# Patient Record
Sex: FEMALE | Race: BLACK OR AFRICAN AMERICAN | NOT HISPANIC OR LATINO | URBAN - METROPOLITAN AREA
[De-identification: names, ages, dates, MRNs, and addresses within clinical notes are randomized per-mention and may not be internally consistent; named-entity substitution may affect disease eponyms.]

---

## 2020-07-25 ENCOUNTER — TELEPHONE ENCOUNTER (OUTPATIENT)
Dept: URBAN - METROPOLITAN AREA CLINIC 13 | Facility: CLINIC | Age: 60
End: 2020-07-25

## 2020-07-26 ENCOUNTER — TELEPHONE ENCOUNTER (OUTPATIENT)
Dept: URBAN - METROPOLITAN AREA CLINIC 13 | Facility: CLINIC | Age: 60
End: 2020-07-26

## 2020-10-07 ENCOUNTER — LAB OUTSIDE AN ENCOUNTER (OUTPATIENT)
Dept: URBAN - METROPOLITAN AREA CLINIC 113 | Facility: CLINIC | Age: 60
End: 2020-10-07

## 2020-10-07 ENCOUNTER — WEB ENCOUNTER (OUTPATIENT)
Dept: URBAN - METROPOLITAN AREA CLINIC 113 | Facility: CLINIC | Age: 60
End: 2020-10-07

## 2020-10-07 ENCOUNTER — OFFICE VISIT (OUTPATIENT)
Dept: URBAN - METROPOLITAN AREA CLINIC 113 | Facility: CLINIC | Age: 60
End: 2020-10-07
Payer: COMMERCIAL

## 2020-10-07 VITALS
HEART RATE: 70 BPM | HEIGHT: 65 IN | WEIGHT: 158 LBS | SYSTOLIC BLOOD PRESSURE: 119 MMHG | BODY MASS INDEX: 26.33 KG/M2 | RESPIRATION RATE: 18 BRPM | TEMPERATURE: 98.1 F | DIASTOLIC BLOOD PRESSURE: 79 MMHG

## 2020-10-07 DIAGNOSIS — Z12.11 COLON CANCER SCREENING: ICD-10-CM

## 2020-10-07 PROCEDURE — 99201 OFFICE OR OTHER OUTPATIENT VISIT FOR THE EVALUATION AND MANAGEMENT OF A NEW PATIENT, WHICH REQUIRES THESE 3 KEY COMPONENTS: A PROBLEM FOCUSED HISTOR: CPT | Performed by: INTERNAL MEDICINE

## 2020-10-07 NOTE — HPI-TODAY'S VISIT:
The patient is a very delightful 60-year-old female who I last saw 10 years ago for screening colonoscopy which was unremarkable.  She has no gastrointestinal complaints currently.  There is no family history of colon cancer.  She has no cardiac or pulmonary issues.

## 2020-10-28 ENCOUNTER — TELEPHONE ENCOUNTER (OUTPATIENT)
Dept: URBAN - METROPOLITAN AREA CLINIC 113 | Facility: CLINIC | Age: 60
End: 2020-10-28

## 2020-10-28 RX ORDER — SODIUM, POTASSIUM,MAG SULFATES 17.5-3.13G
354ML SOLUTION, RECONSTITUTED, ORAL ORAL
Qty: 354 MILLILITER | Refills: 0 | OUTPATIENT
Start: 2020-10-28 | End: 2020-10-29

## 2020-11-02 ENCOUNTER — CLAIMS CREATED FROM THE CLAIM WINDOW (OUTPATIENT)
Dept: URBAN - METROPOLITAN AREA CLINIC 4 | Facility: CLINIC | Age: 60
End: 2020-11-02
Payer: COMMERCIAL

## 2020-11-02 ENCOUNTER — OFFICE VISIT (OUTPATIENT)
Dept: URBAN - METROPOLITAN AREA SURGERY CENTER 25 | Facility: SURGERY CENTER | Age: 60
End: 2020-11-02
Payer: COMMERCIAL

## 2020-11-02 DIAGNOSIS — D12.3 ADENOMA OF TRANSVERSE COLON: ICD-10-CM

## 2020-11-02 DIAGNOSIS — Z12.11 COLON CANCER SCREENING: ICD-10-CM

## 2020-11-02 DIAGNOSIS — K63.89 OTHER SPECIFIED DISEASES OF INTESTINE: ICD-10-CM

## 2020-11-02 DIAGNOSIS — K63.5 BENIGN COLON POLYP: ICD-10-CM

## 2020-11-02 DIAGNOSIS — D12.3 BENIGN NEOPLASM OF TRANSVERSE COLON: ICD-10-CM

## 2020-11-02 PROCEDURE — 45380 COLONOSCOPY AND BIOPSY: CPT | Performed by: INTERNAL MEDICINE

## 2020-11-02 PROCEDURE — 45385 COLONOSCOPY W/LESION REMOVAL: CPT | Performed by: INTERNAL MEDICINE

## 2020-11-02 PROCEDURE — 88305 TISSUE EXAM BY PATHOLOGIST: CPT | Performed by: PATHOLOGY

## 2020-11-02 PROCEDURE — G9933 CANC DETECTD DURING COL SCRN: HCPCS | Performed by: INTERNAL MEDICINE

## 2020-11-02 PROCEDURE — G8907 PT DOC NO EVENTS ON DISCHARG: HCPCS | Performed by: INTERNAL MEDICINE

## 2022-03-29 ENCOUNTER — DASHBOARD ENCOUNTERS (OUTPATIENT)
Age: 62
End: 2022-03-29

## 2022-03-29 ENCOUNTER — OFFICE VISIT (OUTPATIENT)
Dept: URBAN - METROPOLITAN AREA CLINIC 113 | Facility: CLINIC | Age: 62
End: 2022-03-29
Payer: COMMERCIAL

## 2022-03-29 VITALS
RESPIRATION RATE: 20 BRPM | DIASTOLIC BLOOD PRESSURE: 88 MMHG | TEMPERATURE: 98 F | BODY MASS INDEX: 27.16 KG/M2 | HEART RATE: 61 BPM | WEIGHT: 163 LBS | SYSTOLIC BLOOD PRESSURE: 178 MMHG | HEIGHT: 65 IN

## 2022-03-29 DIAGNOSIS — K86.9 PANCREATIC LESION: ICD-10-CM

## 2022-03-29 DIAGNOSIS — Z79.1 NSAID LONG-TERM USE: ICD-10-CM

## 2022-03-29 DIAGNOSIS — Z86.010 HISTORY OF COLON POLYPS: ICD-10-CM

## 2022-03-29 PROCEDURE — 99213 OFFICE O/P EST LOW 20 MIN: CPT | Performed by: PHYSICIAN ASSISTANT

## 2022-03-29 RX ORDER — ROSUVASTATIN CALCIUM 20 MG/1
1 TABLET TABLET, FILM COATED ORAL ONCE A DAY
Status: ACTIVE | COMMUNITY

## 2022-03-29 RX ORDER — PANTOPRAZOLE SODIUM 40 MG/1
1 TABLET TABLET, DELAYED RELEASE ORAL ONCE A DAY
Qty: 90 | Refills: 3 | OUTPATIENT
Start: 2022-03-29

## 2022-03-29 RX ORDER — METOPROLOL SUCCINATE 25 MG/1
1 TABLET TABLET, FILM COATED, EXTENDED RELEASE ORAL ONCE A DAY
Status: ACTIVE | COMMUNITY

## 2022-03-29 RX ORDER — ISOSORBIDE MONONITRATE 30 MG/1
1 TABLET IN THE MORNING TABLET, EXTENDED RELEASE ORAL ONCE A DAY
Status: ACTIVE | COMMUNITY

## 2022-03-29 NOTE — HPI-TODAY'S VISIT:
Ms. Sun is a 62-year-old woman with a history of diverticulosis.  She has been known to Dr. Vance for colon cancer screenings.  She was initially seen by Dr. Vance in 2010 for screening colonoscopy which was unremarkable. She was last seen on 10/7/2020 to discuss colonoscopy for screening purposes. Colonoscopy (11/2/2020): [place] bowel preparation scale score 6.  The examined portion of the ileum was normal.  A 5 mm polyp at the hepatic flexure was removed.  A 2 mm polyp from the hepatic flexure was removed as well.  Diverticulosis was noted in the sigmoid colon, in the transverse colon, at the hepatic flexure and in the ascending colon.  These returned as a combination of tubular adenomas and hyperplastic polyps status post polypectomy.  She is due for repeat colonoscopy in 2027. She had a chest CT performed on 12/7/2021 for evaluation of pulmonary embolism secondary to complaints of shortness of breath and generalized weakness.  His exam was negative for PE, but demonstrated a pancreatic tail hypodensity.  An MRI of the abdomen with and without contrast was recommended. MRI abdomen (1/11/2022): Tiny 3 mm cyst within the pancreatic tail.  Findings likely represent sidebranch IPMN versus pseudocyst or less likely MCN.  No nodularity, enhancement or ductal dilatation.  Follow-up recommended in 6 to 12 months to ensure stability.  She states that she feels well overall today.  She denies any abdominal pain.  She denies any significant changes in her past medical history or surgical history since her last office visit.  She denies any issues with heartburn or acid reflux.  No difficulty swallowing or regurgitation.  Her bowel movements are regular and normal in consistency, stating that she has roughly 3 bowel movements daily without red blood per rectum, melena or hematochezia.  She denies any unintentional weight loss, fevers, chills, night sweats or decreased appetite.  Of note, she does take ibuprofen 400 mg daily.  She tells me that she has been taking this for chronic joint pain in recently has increased her dose to 800 mg secondary to dental pain.  She plans to have an extensive dental surgery next month.  She is not on PPI at this time.  She otherwise feels well today.

## 2022-04-13 PROBLEM — 305058001: Status: ACTIVE | Noted: 2022-03-29

## 2022-04-13 PROBLEM — 428283002: Status: ACTIVE | Noted: 2022-04-13

## 2022-04-13 PROBLEM — 3855007: Status: ACTIVE | Noted: 2022-04-13

## 2025-02-19 ENCOUNTER — LAB OUTSIDE AN ENCOUNTER (OUTPATIENT)
Dept: URBAN - METROPOLITAN AREA CLINIC 113 | Facility: CLINIC | Age: 65
End: 2025-02-19

## 2025-02-19 ENCOUNTER — OFFICE VISIT (OUTPATIENT)
Dept: URBAN - METROPOLITAN AREA CLINIC 113 | Facility: CLINIC | Age: 65
End: 2025-02-19
Payer: COMMERCIAL

## 2025-02-19 VITALS
SYSTOLIC BLOOD PRESSURE: 124 MMHG | TEMPERATURE: 97 F | RESPIRATION RATE: 20 BRPM | HEART RATE: 79 BPM | BODY MASS INDEX: 24.19 KG/M2 | HEIGHT: 65 IN | WEIGHT: 145.2 LBS | DIASTOLIC BLOOD PRESSURE: 65 MMHG

## 2025-02-19 DIAGNOSIS — K86.2 PANCREATIC CYST: ICD-10-CM

## 2025-02-19 DIAGNOSIS — K92.1 HEMATOCHEZIA: ICD-10-CM

## 2025-02-19 DIAGNOSIS — Z86.0101 HISTORY OF ADENOMATOUS POLYP OF COLON: ICD-10-CM

## 2025-02-19 DIAGNOSIS — K57.31 DIVERTICULOSIS OF COLON WITH HEMORRHAGE: ICD-10-CM

## 2025-02-19 DIAGNOSIS — R74.8 ALKALINE PHOSPHATASE ELEVATION: ICD-10-CM

## 2025-02-19 PROBLEM — 733657002: Status: ACTIVE | Noted: 2025-02-19

## 2025-02-19 PROCEDURE — 99214 OFFICE O/P EST MOD 30 MIN: CPT | Performed by: INTERNAL MEDICINE

## 2025-02-19 RX ORDER — METOPROLOL SUCCINATE 25 MG/1
1 TABLET TABLET, FILM COATED, EXTENDED RELEASE ORAL ONCE A DAY
Status: ON HOLD | COMMUNITY

## 2025-02-19 RX ORDER — ISOSORBIDE MONONITRATE 30 MG/1
1 TABLET IN THE MORNING TABLET, EXTENDED RELEASE ORAL ONCE A DAY
Status: ON HOLD | COMMUNITY

## 2025-02-19 RX ORDER — PANTOPRAZOLE SODIUM 40 MG/1
1 TABLET TABLET, DELAYED RELEASE ORAL ONCE A DAY
Qty: 90 | Refills: 3 | Status: ON HOLD | COMMUNITY
Start: 2022-03-29

## 2025-02-19 RX ORDER — ROSUVASTATIN 20 MG/1
1 TABLET TABLET, FILM COATED ORAL ONCE A DAY
Status: ON HOLD | COMMUNITY

## 2025-02-19 NOTE — HPI-TODAY'S VISIT:
Ms. Sun is a 62-year-old woman with a history of diverticulosis.  She has been known to Dr. Vance for colon cancer screenings.  She was initially seen by Dr. Vance in 2010 for screening colonoscopy which was unremarkable. She was last seen on 10/7/2020 to discuss colonoscopy for screening purposes. Colonoscopy (11/2/2020): [place] bowel preparation scale score 6.  The examined portion of the ileum was normal.  A 5 mm polyp at the hepatic flexure was removed.  A 2 mm polyp from the hepatic flexure was removed as well.  Diverticulosis was noted in the sigmoid colon, in the transverse colon, at the hepatic flexure and in the ascending colon.  These returned as a combination of tubular adenomas and hyperplastic polyps status post polypectomy.  She is due for repeat colonoscopy in 2027. She had a chest CT performed on 12/7/2021 for evaluation of pulmonary embolism secondary to complaints of shortness of breath and generalized weakness.  His exam was negative for PE, but demonstrated a pancreatic tail hypodensity.  An MRI of the abdomen with and without contrast was recommended. MRI abdomen (1/11/2022): Tiny 3 mm cyst within the pancreatic tail.  Findings likely represent sidebranch IPMN versus pseudocyst or less likely MCN.  No nodularity, enhancement or ductal dilatation.  Follow-up recommended in 6 to 12 months to ensure stability.  She states that she feels well overall today.  She denies any abdominal pain.  She denies any significant changes in her past medical history or surgical history since her last office visit.  She denies any issues with heartburn or acid reflux.  No difficulty swallowing or regurgitation.  Her bowel movements are regular and normal in consistency, stating that she has roughly 3 bowel movements daily without red blood per rectum, melena or hematochezia.  She denies any unintentional weight loss, fevers, chills, night sweats or decreased appetite.  Of note, she does take ibuprofen 400 mg daily.  She tells me that she has been taking this for chronic joint pain in recently has increased her dose to 800 mg secondary to dental pain.  She plans to have an extensive dental surgery next month.  She is not on PPI at this time.  She otherwise feels well today. Interval history, 2/19/2025. Patient returns today after long interval absence. Referring records were reviewed.  Laboratory testing dated November 19, 2024 revealed a normal CMP except for a marginally elevated alkaline phosphatase at 122.  Hemoglobin A1c was 6.0. The patient comes in urgently.  She states that beginning 4 days ago she developed large amounts of bright red blood per rectum including clots.  The stool then became darker and then she had another episode of clots and now the stool is clearing up.  She is not on any blood thinners.  She denies any significant nonsteroidal anti-inflammatory agents.  She states she feels well otherwise. She denies any new past medical or past surgical history in the last 3 years since have seen her.

## 2025-02-21 ENCOUNTER — TELEPHONE ENCOUNTER (OUTPATIENT)
Dept: URBAN - METROPOLITAN AREA CLINIC 113 | Facility: CLINIC | Age: 65
End: 2025-02-21

## 2025-02-21 ENCOUNTER — CLAIMS CREATED FROM THE CLAIM WINDOW (OUTPATIENT)
Dept: URBAN - METROPOLITAN AREA MEDICAL CENTER 19 | Facility: MEDICAL CENTER | Age: 65
End: 2025-02-21
Payer: COMMERCIAL

## 2025-02-21 DIAGNOSIS — K57.90 DIVERTICULOSIS OF INTESTINE, PART UNSPECIFIED, WITHOUT PERFORATION OR ABSCESS WITHOUT BLEEDING: ICD-10-CM

## 2025-02-21 DIAGNOSIS — K59.00 CONSTIPATION, UNSPECIFIED: ICD-10-CM

## 2025-02-21 DIAGNOSIS — K57.31 DIVERTICULOSIS OF LARGE INTESTINE WITHOUT PERFORATION OR ABSCESS WITH BLEEDING: ICD-10-CM

## 2025-02-21 DIAGNOSIS — D62 ACUTE POSTHEMORRHAGIC ANEMIA: ICD-10-CM

## 2025-02-21 LAB
ABSOLUTE BASOPHILS: 21
ABSOLUTE EOSINOPHILS: 97
ABSOLUTE LYMPHOCYTES: 2381
ABSOLUTE MONOCYTES: 511
ABSOLUTE NEUTROPHILS: 3892
BASOPHILS: 0.3
EOSINOPHILS: 1.4
HEMATOCRIT: 26.7
HEMOGLOBIN: 8.6
LYMPHOCYTES: 34.5
MCH: 26.5
MCHC: 32.2
MCV: 82.2
MONOCYTES: 7.4
MPV: 10.6
NEUTROPHILS: 56.4
PLATELET COUNT: 261
RDW: 13.9
RED BLOOD CELL COUNT: 3.25
WHITE BLOOD CELL COUNT: 6.9

## 2025-02-21 PROCEDURE — 99254 IP/OBS CNSLTJ NEW/EST MOD 60: CPT | Performed by: INTERNAL MEDICINE

## 2025-02-22 ENCOUNTER — TELEPHONE ENCOUNTER (OUTPATIENT)
Dept: URBAN - METROPOLITAN AREA CLINIC 113 | Facility: CLINIC | Age: 65
End: 2025-02-22

## 2025-03-21 ENCOUNTER — CLAIMS CREATED FROM THE CLAIM WINDOW (OUTPATIENT)
Dept: URBAN - METROPOLITAN AREA SURGERY CENTER 25 | Facility: SURGERY CENTER | Age: 65
End: 2025-03-21
Payer: COMMERCIAL

## 2025-03-21 DIAGNOSIS — K92.1 MELENA: ICD-10-CM

## 2025-03-21 DIAGNOSIS — K92.1 HEMATOCHEZIA: ICD-10-CM

## 2025-03-21 PROCEDURE — 45378 DIAGNOSTIC COLONOSCOPY: CPT | Performed by: INTERNAL MEDICINE

## 2025-03-21 PROCEDURE — 00811 ANES LWR INTST NDSC NOS: CPT | Performed by: ANESTHESIOLOGIST ASSISTANT

## 2025-03-21 PROCEDURE — 00811 ANES LWR INTST NDSC NOS: CPT | Performed by: ANESTHESIOLOGY

## 2025-03-21 RX ORDER — ROSUVASTATIN 20 MG/1
1 TABLET TABLET, FILM COATED ORAL ONCE A DAY
Status: ON HOLD | COMMUNITY

## 2025-03-21 RX ORDER — METOPROLOL SUCCINATE 25 MG/1
1 TABLET TABLET, FILM COATED, EXTENDED RELEASE ORAL ONCE A DAY
Status: ON HOLD | COMMUNITY

## 2025-03-21 RX ORDER — ISOSORBIDE MONONITRATE 30 MG/1
1 TABLET IN THE MORNING TABLET, EXTENDED RELEASE ORAL ONCE A DAY
Status: ON HOLD | COMMUNITY

## 2025-03-21 RX ORDER — PANTOPRAZOLE SODIUM 40 MG/1
1 TABLET TABLET, DELAYED RELEASE ORAL ONCE A DAY
Qty: 90 | Refills: 3 | Status: ON HOLD | COMMUNITY
Start: 2022-03-29

## 2025-05-02 ENCOUNTER — OFFICE VISIT (OUTPATIENT)
Dept: URBAN - METROPOLITAN AREA CLINIC 113 | Facility: CLINIC | Age: 65
End: 2025-05-02